# Patient Record
Sex: MALE | Race: OTHER | Employment: FULL TIME | URBAN - METROPOLITAN AREA
[De-identification: names, ages, dates, MRNs, and addresses within clinical notes are randomized per-mention and may not be internally consistent; named-entity substitution may affect disease eponyms.]

---

## 2024-01-22 ENCOUNTER — TELEPHONE (OUTPATIENT)
Age: 49
End: 2024-01-22

## 2024-02-26 ENCOUNTER — INITIAL CONSULT (OUTPATIENT)
Age: 49
End: 2024-02-26
Payer: COMMERCIAL

## 2024-02-26 VITALS
HEIGHT: 65 IN | SYSTOLIC BLOOD PRESSURE: 126 MMHG | DIASTOLIC BLOOD PRESSURE: 88 MMHG | HEART RATE: 65 BPM | BODY MASS INDEX: 24.19 KG/M2 | WEIGHT: 145.2 LBS

## 2024-02-26 DIAGNOSIS — E78.2 MIXED HYPERLIPIDEMIA: ICD-10-CM

## 2024-02-26 DIAGNOSIS — I10 ESSENTIAL HYPERTENSION: ICD-10-CM

## 2024-02-26 DIAGNOSIS — I25.10 CORONARY ARTERY DISEASE INVOLVING NATIVE CORONARY ARTERY OF NATIVE HEART WITHOUT ANGINA PECTORIS: Primary | ICD-10-CM

## 2024-02-26 DIAGNOSIS — Z82.49 FAMILY HISTORY OF EARLY CAD: ICD-10-CM

## 2024-02-26 PROCEDURE — 3074F SYST BP LT 130 MM HG: CPT | Performed by: INTERNAL MEDICINE

## 2024-02-26 PROCEDURE — 93000 ELECTROCARDIOGRAM COMPLETE: CPT | Performed by: INTERNAL MEDICINE

## 2024-02-26 PROCEDURE — G8484 FLU IMMUNIZE NO ADMIN: HCPCS | Performed by: INTERNAL MEDICINE

## 2024-02-26 PROCEDURE — 99204 OFFICE O/P NEW MOD 45 MIN: CPT | Performed by: INTERNAL MEDICINE

## 2024-02-26 PROCEDURE — G8420 CALC BMI NORM PARAMETERS: HCPCS | Performed by: INTERNAL MEDICINE

## 2024-02-26 PROCEDURE — 3079F DIAST BP 80-89 MM HG: CPT | Performed by: INTERNAL MEDICINE

## 2024-02-26 PROCEDURE — G8427 DOCREV CUR MEDS BY ELIG CLIN: HCPCS | Performed by: INTERNAL MEDICINE

## 2024-02-26 RX ORDER — ATORVASTATIN CALCIUM 40 MG/1
40 TABLET, FILM COATED ORAL DAILY
COMMUNITY
Start: 2024-01-10

## 2024-02-26 RX ORDER — METOPROLOL SUCCINATE 25 MG/1
25 TABLET, EXTENDED RELEASE ORAL DAILY
COMMUNITY
Start: 2024-01-10

## 2024-02-26 RX ORDER — ASPIRIN 81 MG/1
81 TABLET ORAL DAILY
COMMUNITY

## 2024-02-26 ASSESSMENT — ENCOUNTER SYMPTOMS
EYE REDNESS: 0
HOARSE VOICE: 0
WHEEZING: 0
HEMOPTYSIS: 0
DOUBLE VISION: 0
HEMATEMESIS: 0
STRIDOR: 0
HEMATOCHEZIA: 0
ABDOMINAL PAIN: 0

## 2024-02-26 NOTE — PROGRESS NOTES
[facility] CARDIOLOGY  [address] [place] [address], [address]  [place] [address]  PHONE: [phone]          24    NAME:  Enedina Byrnes  : 1975  MRN: [id number]         SUBJECTIVE:   Enedina Byrnes is a 48 y.o. male seen for a visit regarding the following:     Chief Complaint   Patient presents with    Consultation    Hyperlipidemia    Hypertension    Coronary Artery Disease    Establish Cardiologist           HPI:    Cardiac problem list:  1.  Coronary artery disease with previous stents in 10/14/2019--2.25 x 18 mm resolute [place] IGLESIA to RCA, 3.0 x 30 mm resolute [place] IGLESIA to LAD  2.  Hypertension  3.  Hyperlipidemia  4.  Type 2 diabetes mellitus  5.  Family history of coronary artery disease-father  of myocardial infarction, 1 brother had a heart attack  6.  Former smoker  -Previous cardiologist-Dr. Godfrey Morris-[facility]-medicor cardiology      Dear Dr. Christine,  I saw Mr. Byrnes who is a pleasant 48-year-old gentleman cardiovascular consultation for coronary artery disease with previous PCI, hypertension, hyperlipidemia type 2 diabetes, family history of coronary artery disease and is a former smoker.    CAD: No complaints of any angina.  He says he remains very active for the most.  He exercises regularly although lately during the wintertime, he has not been as active.  He understands to not remain active.  No significant lower extremity edema orthopnea PND or any worsening dyspnea exertion either.  No chest pain like he had in the past.    Hypertension: Well-controlled on just metoprolol succinate.  No headaches blurry vision.  He says he monitors his pressures carefully at home.    Hyperlipidemia-remains on atorvastatin therapy.  Triglycerides were over thousand initially but his diabetes was uncontrolled but much better controlled currently.  No significant myalgias    Type 2 diabetes mellitus-well-controlled with just lifestyle measures at this point.  Has lost a lot of

## 2024-08-30 ENCOUNTER — OFFICE VISIT (OUTPATIENT)
Age: 49
End: 2024-08-30
Payer: COMMERCIAL

## 2024-08-30 VITALS
HEART RATE: 64 BPM | BODY MASS INDEX: 22.8 KG/M2 | WEIGHT: 137 LBS | DIASTOLIC BLOOD PRESSURE: 70 MMHG | SYSTOLIC BLOOD PRESSURE: 118 MMHG

## 2024-08-30 DIAGNOSIS — I25.10 CORONARY ARTERY DISEASE INVOLVING NATIVE CORONARY ARTERY OF NATIVE HEART WITHOUT ANGINA PECTORIS: Primary | ICD-10-CM

## 2024-08-30 DIAGNOSIS — Z82.49 FAMILY HISTORY OF EARLY CAD: ICD-10-CM

## 2024-08-30 DIAGNOSIS — I10 ESSENTIAL HYPERTENSION: ICD-10-CM

## 2024-08-30 DIAGNOSIS — E78.2 MIXED HYPERLIPIDEMIA: ICD-10-CM

## 2024-08-30 DIAGNOSIS — Z95.5 S/P CORONARY ARTERY STENT PLACEMENT: ICD-10-CM

## 2024-08-30 PROCEDURE — G8427 DOCREV CUR MEDS BY ELIG CLIN: HCPCS | Performed by: INTERNAL MEDICINE

## 2024-08-30 PROCEDURE — 3074F SYST BP LT 130 MM HG: CPT | Performed by: INTERNAL MEDICINE

## 2024-08-30 PROCEDURE — G8420 CALC BMI NORM PARAMETERS: HCPCS | Performed by: INTERNAL MEDICINE

## 2024-08-30 PROCEDURE — 99214 OFFICE O/P EST MOD 30 MIN: CPT | Performed by: INTERNAL MEDICINE

## 2024-08-30 PROCEDURE — 3078F DIAST BP <80 MM HG: CPT | Performed by: INTERNAL MEDICINE

## 2024-08-30 PROCEDURE — 1036F TOBACCO NON-USER: CPT | Performed by: INTERNAL MEDICINE

## 2024-08-30 ASSESSMENT — ENCOUNTER SYMPTOMS
EYE REDNESS: 0
ABDOMINAL PAIN: 0
STRIDOR: 0
WHEEZING: 0
DOUBLE VISION: 0
HEMATOCHEZIA: 0
HEMOPTYSIS: 0
HEMATEMESIS: 0
HOARSE VOICE: 0

## 2024-08-30 NOTE — PROGRESS NOTES
[address], [address]  [place] [address]  PHONE: [phone]          24    NAME:  Enedina Byrnes  : 1975  MRN: [id number]         SUBJECTIVE:   Enedina Byrnes is a 49 y.o. male seen for a visit regarding the following:     Chief Complaint   Patient presents with    6 Month Follow-Up    Coronary Artery Disease           HPI:    Cardiac problem list:  1.  Coronary artery disease with previous stents   -Original anterior STEMI-10/11/2019- 100 percent occluded proximal LAD stented with 3.0 x 30 mm resolute IGLESIA, 50% mid left circumflex, staged mid RCA 90%-10/14 /2019-2.25 x 18 mm resolute [place] IGLESIA  Echo-10/18/2019-EF 55 to 60%, anteroseptal wall hypokinesis noted  2.  Hypertension  3.  Hyperlipidemia  4.  Type 2 diabetes mellitus  5.  Family history of coronary artery disease-father  of myocardial infarction, 1 brother had a heart attack  6.  Former smoker  -Previous cardiologist-Dr. Godfrey Morris-[facility]-medicor cardiology      Dear Dr. Christine,  I saw Mr. Byrnes who is a pleasant 49-year-old gentleman cardiovascular follow up for coronary artery disease with previous PCI, hypertension, hyperlipidemia type 2 diabetes, family history of coronary artery disease and is a former smoker.    We last met with him 6 months ago at which time we managed to track down his most recent cardiac records and previous heart cath/echocardiogram results.    CAD: No complaints of any angina.  He says he remains very active for the most.  He has been exercising regularly-[employer] walks regularly at least 4 to 5 days a week for more than 30 minutes with no limitations.  No significant lower extremity edema orthopnea PND or anything suggestive of heart failure.    Hypertension: Well-controlled on just metoprolol succinate.  No headaches blurry vision.  He says he monitors his pressures carefully at home.    Hyperlipidemia-remains on atorvastatin therapy.